# Patient Record
Sex: MALE | Race: WHITE | NOT HISPANIC OR LATINO | Employment: FULL TIME | URBAN - METROPOLITAN AREA
[De-identification: names, ages, dates, MRNs, and addresses within clinical notes are randomized per-mention and may not be internally consistent; named-entity substitution may affect disease eponyms.]

---

## 2017-01-11 ENCOUNTER — OFFICE VISIT (OUTPATIENT)
Dept: ENDOCRINOLOGY | Age: 47
End: 2017-01-11

## 2017-01-11 VITALS
WEIGHT: 283 LBS | HEIGHT: 76 IN | SYSTOLIC BLOOD PRESSURE: 120 MMHG | HEART RATE: 79 BPM | DIASTOLIC BLOOD PRESSURE: 64 MMHG | BODY MASS INDEX: 34.46 KG/M2

## 2017-01-11 DIAGNOSIS — E03.8 OTHER SPECIFIED HYPOTHYROIDISM: ICD-10-CM

## 2017-01-11 DIAGNOSIS — E10.9 TYPE 1 DIABETES MELLITUS WITHOUT COMPLICATION (HCC): Primary | ICD-10-CM

## 2017-01-11 DIAGNOSIS — E78.49 OTHER HYPERLIPIDEMIA: ICD-10-CM

## 2017-01-11 PROCEDURE — 95251 CONT GLUC MNTR ANALYSIS I&R: CPT | Performed by: INTERNAL MEDICINE

## 2017-01-11 PROCEDURE — 99205 OFFICE O/P NEW HI 60 MIN: CPT | Performed by: INTERNAL MEDICINE

## 2017-01-11 RX ORDER — LISINOPRIL 5 MG/1
5 TABLET ORAL
COMMUNITY
End: 2017-04-17 | Stop reason: SDUPTHER

## 2017-01-11 RX ORDER — LEVOTHYROXINE SODIUM 88 UG/1
88 TABLET ORAL DAILY
COMMUNITY

## 2017-01-11 NOTE — PROGRESS NOTES
CC : Type 1 dm.     Roberto Rees 46 y.o. WM presents with Type 1 dm as a new patient. Pt moved from [place] to [place] recently. Referred by Lucina Caputo NP.     Type 1 dm - Type 1 DM diagnosed about 5 years ago. Diagnosed with Type 2 dm about 14 year ago but he was diagnosed with Type 1 dm when his BG spiked.   Per prior Endocrine records his C - peptide was low and had PAMELA ab positive.   He was being seen by an Endocrinologist at [place]. Has been on insulin pump for about 5 years now. Insulin in the pump is humalog. Changes pump site every 3 days.  Also on metformin and Victoza, per pt he is on victoza as a part of clinical trial.   Checks BG 3- 8 times a day. Pt also has a sensor. Pt has been on sensor due to BG variability.   FBG - 112 - 142 mg/dl and Pre - meals -   92 - 224 mg/dl.   No increased urination or increased thirst. No BG less than 60 mg/dl in the last one month, does have hypoglycemic awareness. Highest BG in the last 1 month was -  250 mg/dl.  Downloaded the pump and sensor for review.   No nausea and vomiting.   Due for eye exam on Jan 30 th and no dm retinopathy.   No tingling or numbness in his b/l feet, no ulcers and amputations of his feet.   No CAD, CKD,CVA per pt.   Pt is physically active. Pt trying to lose weight and lost about 30 pounds.   Pt tries to follow DM diet for most part.   On Ace inb.  Prior DKA episodes.     Hba1c - 5.67% from his endocrine records.     Insulin pump - Omnipod - Basal - 63.35   12 - 4:30 am - 2.65  4:30 - 12 pm - 2.70  12 pm - 12 am - 2.60    Bolus   Target - 80 -140 mg/dl  Carbs - 1 unit - 7 gm of carbs  Correction - 1 unit - 6 mg/dl.   Active insulin time - 3 hrs.     Sensor - Dexcom - Setting 80 - 130 mg/dl and pt calibrates twice a day.     Hypothyroidism - On levothyroxine 88 mcg oral daily. Takes the medication on empty stomach.     I have reviewed the patient's allergies, medicines, past medical hx, family hx and social hx in detail.    Past Medical  History   Diagnosis Date   • Diabetes mellitus type 2, uncontrolled    • Dyslipidemia    • ED (erectile dysfunction)    • Hypertension    • NAFLD (nonalcoholic fatty liver disease)    • Obesity    • Retinopathy    • Sleep apnea    • Type 1 diabetes mellitus    • Vitamin D deficiency        Family History   Problem Relation Age of Onset   • Gout Mother    • Diabetes Mother    • Diabetes Father    • Heart disease Father    • Hypertension Father        Social History     Social History   • Marital status: Unknown     Spouse name: N/A   • Number of children: N/A   • Years of education: N/A     Occupational History   • Not on file.     Social History Main Topics   • Smoking status: Not on file   • Smokeless tobacco: Not on file   • Alcohol use Not on file   • Drug use: Not on file   • Sexual activity: Not on file     Other Topics Concern   • Not on file     Social History Narrative   • No narrative on file       No Known Allergies      Current Outpatient Prescriptions:   •  Cholecalciferol (DELTA D3 PO), Take  by mouth., Disp: , Rfl:   •  levothyroxine (SYNTHROID, LEVOTHROID) 88 MCG tablet, Take 88 mcg by mouth Daily., Disp: , Rfl:   •  lisinopril (PRINIVIL,ZESTRIL) 5 MG tablet, Take 5 mg by mouth., Disp: , Rfl:   •  metFORMIN (GLUCOPHAGE) 1000 MG tablet, Take 1,000 mg by mouth., Disp: , Rfl:   •  SIMVASTATIN PO, Take 20 mg by mouth., Disp: , Rfl:     Review of Systems  Review of Systems   Constitutional: Positive for chills. Negative for appetite change, diaphoresis and fatigue.   HENT: Negative for hearing loss, nosebleeds, postnasal drip, trouble swallowing and voice change.    Eyes: Negative for photophobia, discharge and visual disturbance.   Respiratory: Negative for cough, shortness of breath and wheezing.    Cardiovascular: Positive for leg swelling. Negative for chest pain and palpitations.   Gastrointestinal: Negative for abdominal distention, abdominal pain, constipation, diarrhea, nausea and vomiting.  "  Endocrine: Positive for heat intolerance. Negative for cold intolerance, polydipsia and polyuria.   Genitourinary: Negative for dysuria, frequency and hematuria.   Musculoskeletal: Negative for arthralgias, back pain and myalgias.   Skin: Negative for pallor, rash and wound.   Neurological: Negative for dizziness, tremors, seizures, syncope, weakness, numbness and headaches.   Hematological: Positive for adenopathy.   Psychiatric/Behavioral: Positive for sleep disturbance. Negative for agitation and confusion. The patient is not nervous/anxious.          Objective:     Visit Vitals   • /64   • Pulse 79   • Ht 76\" (193 cm)   • Wt 283 lb (128 kg)   • BMI 34.45 kg/m2       Physical Exam   Constitutional: He is oriented to person, place, and time. He appears well-developed and well-nourished.   HENT:   Head: Normocephalic and atraumatic.   Mouth/Throat: Oropharynx is clear and moist.   Sinus tenderness   Eyes: Conjunctivae and EOM are normal. Pupils are equal, round, and reactive to light.   Neck: Normal range of motion. Neck supple. Carotid bruit is not present. No tracheal deviation present. No thyromegaly present.   Cardiovascular: Normal rate, regular rhythm and normal heart sounds.    Pulmonary/Chest: Effort normal and breath sounds normal. No stridor. He has no wheezes.   Abdominal: Soft. Bowel sounds are normal. He exhibits no distension. There is no tenderness. No hernia.   Musculoskeletal: Normal range of motion. He exhibits no edema.   Lymphadenopathy:     He has no cervical adenopathy.   Neurological: He is alert and oriented to person, place, and time. He has normal reflexes.   Intact pin prick and proprioception   Skin: Skin is warm and dry.   Psychiatric: He has a normal mood and affect. His behavior is normal. Judgment normal.   Vitals reviewed.        Results Review:    I reviewed the patient's new clinical results.    No results found for any previous visit.      Diagnoses and all orders for " this visit:    Type 1 diabetes mellitus without complication  -     Hemoglobin A1c  -     Vitamin D 25 Hydroxy  -     Vitamin B12 & Folate  -     TSH  -     Microalbumin / Creatinine Urine Ratio  -     Comprehensive Metabolic Panel  -     Glutamic Acid Decarboxylase  -     Insulin Antibody  -     C-Peptide  -     Hemoglobin A1c; Future  -     TSH; Future    Other hyperlipidemia  -     Hemoglobin A1c  -     Vitamin D 25 Hydroxy  -     Vitamin B12 & Folate  -     TSH  -     Microalbumin / Creatinine Urine Ratio  -     Comprehensive Metabolic Panel  -     Glutamic Acid Decarboxylase  -     Insulin Antibody  -     C-Peptide  -     Hemoglobin A1c; Future  -     TSH; Future    Other specified hypothyroidism  -     Hemoglobin A1c  -     Vitamin D 25 Hydroxy  -     Vitamin B12 & Folate  -     TSH  -     Microalbumin / Creatinine Urine Ratio  -     Comprehensive Metabolic Panel  -     Glutamic Acid Decarboxylase  -     Insulin Antibody  -     C-Peptide  -     Hemoglobin A1c; Future  -     TSH; Future    Type 1 diabetes mellitus  Will continue the current insulin pump settings  Advised the patient to stop taking Victoza has he is a type I diabetic.  Will continue metformin 1000 mg twice daily  Advised the patient if he has blood sugars in the morning high to increase his bolus regimen at dinnertime to 1 unit for 6 g of carbs.  Due for eye exam for diabetic retinopathy screening    Hyperlipidemia  On simvastatin, we will check lipid panel    Hypothyroidism continue levothyroxine 88 µg oral daily.    The total time spent more than 80 min for old record and lab review and face- to- face, of which greater than 50% of time was spent in counseling the patient on treatment options, instructions for  management/treatment and follow up and importance of compliance with chosen management or treatment options      Nguyễn Rosales MD  01/11/17

## 2017-01-11 NOTE — MR AVS SNAPSHOT
Roberto Rees   2017 8:00 AM   Office Visit    Dept Phone:  [phone]   Encounter #:  [id number]    Provider:  Nguyễn Rosales MD   Department:  [facility] ENDOCRINOLOGY                Your Full Care Plan              Today's Medication Changes          These changes are accurate as of: 17  8:58 AM.  If you have any questions, ask your nurse or doctor.               Stop taking medication(s)listed here:     VICTOZA 18 MG/3ML solution pen-injector   Generic drug:  Liraglutide   Stopped by:  Nguyễn Rosales MD                      Your Updated Medication List          This list is accurate as of: 17  8:58 AM.  Always use your most recent med list.                DELTA D3 PO       levothyroxine 88 MCG tablet   Commonly known as:  SYNTHROID, LEVOTHROID       lisinopril 5 MG tablet   Commonly known as:  PRINIVIL,ZESTRIL       metFORMIN 1000 MG tablet   Commonly known as:  GLUCOPHAGE       SIMVASTATIN PO               Instructions     None    Patient Instructions History      Upcoming Appointments     Visit Type Date Time Department    NEW PATIENT 2017  8:00 AM [facility]    OFFICE VISIT 3/16/2017  8:00 AM [facility]      nap- Naturally [employer] Parents Signup     [facility] nap- Naturally Att[employer] Parents allows you to send messages to your doctor, view your test results, renew your prescriptions, schedule appointments, and more. To sign up, go to [employer]) and click on the Sign Up Now link in the New User? box. Enter your nap- Naturally Att[employer] Parents Activation Code exactly as it appears below along with the last four digits of your Social Security Number and your Date of Birth () to complete the sign-up process. If you do not sign up before the expiration date, you must request a new code.    nap- Naturally Attached Parents Activation Code: [email or web address]  Expires: 2017  8:56 AM    If you have questions, you can email [email or web address] or call [phone] to talk to our nap- Naturally Attached Parents staff.  "Remember, MyChart is NOT to be used for urgent needs. For medical emergencies, dial 911.               Other Info from Your Visit           Your Appointments     Mar 16, 2017  8:00 AM EDT   Office Visit with Nguyễn Rosales MD   [facility] ENDOCRINOLOGY (--)    [address] [place] [id number] [phone]           Arrive 15 minutes prior to appointment.              Allergies     No Known Allergies      Vital Signs     Blood Pressure Pulse Height Weight Body Mass Index       120/64 79 76\" (193 cm) 283 lb (128 kg) 34.45 kg/m2         "

## 2017-01-16 LAB
25(OH)D3+25(OH)D2 SERPL-MCNC: 42.2 NG/ML (ref 30–100)
ALBUMIN SERPL-MCNC: 4.5 G/DL (ref 3.5–5.2)
ALBUMIN/CREAT UR: <3.4 MG/G CREAT (ref 0–30)
ALBUMIN/GLOB SERPL: 1.7 G/DL
ALP SERPL-CCNC: 65 U/L (ref 39–117)
ALT SERPL-CCNC: 42 U/L (ref 1–41)
AST SERPL-CCNC: 23 U/L (ref 1–40)
BILIRUB SERPL-MCNC: 0.4 MG/DL (ref 0.1–1.2)
BUN SERPL-MCNC: 20 MG/DL (ref 6–20)
BUN/CREAT SERPL: 23.5 (ref 7–25)
C PEPTIDE SERPL-MCNC: 0.8 NG/ML (ref 1.1–4.4)
CALCIUM SERPL-MCNC: 9.6 MG/DL (ref 8.6–10.5)
CHLORIDE SERPL-SCNC: 104 MMOL/L (ref 98–107)
CO2 SERPL-SCNC: 20.5 MMOL/L (ref 22–29)
CREAT SERPL-MCNC: 0.85 MG/DL (ref 0.76–1.27)
CREAT UR-MCNC: 87.1 MG/DL
FOLATE SERPL-MCNC: >20 NG/ML (ref 4.78–24.2)
GAD65 AB SER IA-ACNC: 25.6 U/ML (ref 0–5)
GLOBULIN SER CALC-MCNC: 2.7 GM/DL
GLUCOSE SERPL-MCNC: 152 MG/DL (ref 65–99)
HBA1C MFR BLD: 6.59 % (ref 4.8–5.6)
INSULIN AB SER-ACNC: 46 UU/ML
MICROALBUMIN UR-MCNC: <3 UG/ML
POTASSIUM SERPL-SCNC: 5.3 MMOL/L (ref 3.5–5.2)
PROT SERPL-MCNC: 7.2 G/DL (ref 6–8.5)
SODIUM SERPL-SCNC: 139 MMOL/L (ref 136–145)
TSH SERPL DL<=0.005 MIU/L-ACNC: 0.51 MIU/ML (ref 0.27–4.2)
VIT B12 SERPL-MCNC: 667 PG/ML (ref 211–946)

## 2017-01-17 NOTE — PROGRESS NOTES
Mail results to pt, no treatment changes at this time. Hba1c well controlled. Antibodies positive suggesting that you are type 1 dm. Given C peptide low would not use victoza.

## 2017-03-11 ENCOUNTER — RESULTS ENCOUNTER (OUTPATIENT)
Dept: ENDOCRINOLOGY | Age: 47
End: 2017-03-11

## 2017-03-11 DIAGNOSIS — E03.8 OTHER SPECIFIED HYPOTHYROIDISM: ICD-10-CM

## 2017-03-11 DIAGNOSIS — E78.49 OTHER HYPERLIPIDEMIA: ICD-10-CM

## 2017-03-11 DIAGNOSIS — E10.9 TYPE 1 DIABETES MELLITUS WITHOUT COMPLICATION (HCC): ICD-10-CM

## 2017-04-17 RX ORDER — LISINOPRIL 5 MG/1
5 TABLET ORAL DAILY
Qty: 90 TABLET | Refills: 3 | Status: SHIPPED | OUTPATIENT
Start: 2017-04-17

## 2017-04-17 NOTE — TELEPHONE ENCOUNTER
----- Message from Kathe De Luna sent at 4/17/2017 11:24 AM EDT -----  Contact: patient  accu che smart view  Test 2 - 6 times a day    Use 150 a month     90 day supply    [id number] - [place], [place] - [facility] - [phone]  - [phone] (Phone)  [phone] (Fax)    Also needs prescription for the three below    humalog  vials         lisinopril (PRINIVIL,ZESTRIL) 5 MG tablet         Sig - Route: Take 5 mg by mouth.    metFORMIN (GLUCOPHAGE) 1000 MG tablet         Sig - Route: Take 1,000 mg by mouth. l              Spoke with patient   Medication has been sent to pharmacy

## 2017-10-06 ENCOUNTER — TELEPHONE (OUTPATIENT)
Dept: ENDOCRINOLOGY | Age: 47
End: 2017-10-06

## 2017-10-06 NOTE — TELEPHONE ENCOUNTER
Patient called and stated that he lost his job and health insurance and that is the reason he has been into the office for a follow up visit. Patient stated that because he has no income he can no long affored his medication and is a type 1 diabetic patient was wanting to know if there was any patient assistant programs let patient know that I put insulin sample up for him and will see if I can get synthroid samples for him also. Patient understood

## 2018-01-03 DIAGNOSIS — E03.8 OTHER SPECIFIED HYPOTHYROIDISM: ICD-10-CM

## 2018-01-03 DIAGNOSIS — E10.9 TYPE 1 DIABETES MELLITUS WITHOUT COMPLICATION (HCC): Primary | ICD-10-CM
